# Patient Record
Sex: FEMALE | Race: WHITE
[De-identification: names, ages, dates, MRNs, and addresses within clinical notes are randomized per-mention and may not be internally consistent; named-entity substitution may affect disease eponyms.]

---

## 2018-12-30 ENCOUNTER — HOSPITAL ENCOUNTER (EMERGENCY)
Dept: HOSPITAL 76 - ED | Age: 23
Discharge: HOME | End: 2018-12-30
Payer: COMMERCIAL

## 2018-12-30 VITALS — DIASTOLIC BLOOD PRESSURE: 57 MMHG | SYSTOLIC BLOOD PRESSURE: 96 MMHG

## 2018-12-30 DIAGNOSIS — R10.10: ICD-10-CM

## 2018-12-30 DIAGNOSIS — R11.2: ICD-10-CM

## 2018-12-30 DIAGNOSIS — E86.0: Primary | ICD-10-CM

## 2018-12-30 DIAGNOSIS — R19.7: ICD-10-CM

## 2018-12-30 LAB
ALBUMIN DIAFP-MCNC: 4.6 G/DL (ref 3.2–5.5)
ALBUMIN/GLOB SERPL: 1.7 {RATIO} (ref 1–2.2)
ALP SERPL-CCNC: 38 IU/L (ref 42–121)
ALT SERPL W P-5'-P-CCNC: 33 IU/L (ref 10–60)
ANION GAP SERPL CALCULATED.4IONS-SCNC: 9 MMOL/L (ref 6–13)
AST SERPL W P-5'-P-CCNC: 31 IU/L (ref 10–42)
BASOPHILS NFR BLD AUTO: 0 10^3/UL (ref 0–0.1)
BASOPHILS NFR BLD AUTO: 0.2 %
BILIRUB BLD-MCNC: 0.7 MG/DL (ref 0.2–1)
BUN SERPL-MCNC: 16 MG/DL (ref 6–20)
CALCIUM UR-MCNC: 9 MG/DL (ref 8.5–10.3)
CHLORIDE SERPL-SCNC: 108 MMOL/L (ref 101–111)
CO2 SERPL-SCNC: 25 MMOL/L (ref 21–32)
CREAT SERPLBLD-SCNC: 0.6 MG/DL (ref 0.4–1)
EOSINOPHIL # BLD AUTO: 0 10^3/UL (ref 0–0.7)
EOSINOPHIL NFR BLD AUTO: 0.3 %
ERYTHROCYTE [DISTWIDTH] IN BLOOD BY AUTOMATED COUNT: 12.6 % (ref 12–15)
GFRSERPLBLD MDRD-ARVRAT: 126 ML/MIN/{1.73_M2} (ref 89–?)
GLOBULIN SER-MCNC: 2.7 G/DL (ref 2.1–4.2)
GLUCOSE SERPL-MCNC: 156 MG/DL (ref 70–100)
HGB UR QL STRIP: 14.9 G/DL (ref 12–16)
LIPASE SERPL-CCNC: 19 U/L (ref 22–51)
LYMPHOCYTES # SPEC AUTO: 0.3 10^3/UL (ref 1.5–3.5)
LYMPHOCYTES NFR BLD AUTO: 3 %
MCH RBC QN AUTO: 30.7 PG (ref 27–31)
MCHC RBC AUTO-ENTMCNC: 35.2 G/DL (ref 32–36)
MCV RBC AUTO: 87.1 FL (ref 81–99)
MONOCYTES # BLD AUTO: 0.6 10^3/UL (ref 0–1)
MONOCYTES NFR BLD AUTO: 5.8 %
NEUTROPHILS # BLD AUTO: 8.9 10^3/UL (ref 1.5–6.6)
NEUTROPHILS # SNV AUTO: 9.8 X10^3/UL (ref 4.8–10.8)
NEUTROPHILS NFR BLD AUTO: 90.7 %
PDW BLD AUTO: 8.2 FL (ref 7.9–10.8)
PLATELET # BLD: 194 10^3/UL (ref 130–450)
PROT SPEC-MCNC: 7.3 G/DL (ref 6.7–8.2)
RBC MAR: 4.87 10^6/UL (ref 4.2–5.4)
SODIUM SERPLBLD-SCNC: 142 MMOL/L (ref 135–145)

## 2018-12-30 PROCEDURE — 96361 HYDRATE IV INFUSION ADD-ON: CPT

## 2018-12-30 PROCEDURE — 83690 ASSAY OF LIPASE: CPT

## 2018-12-30 PROCEDURE — 96375 TX/PRO/DX INJ NEW DRUG ADDON: CPT

## 2018-12-30 PROCEDURE — 96374 THER/PROPH/DIAG INJ IV PUSH: CPT

## 2018-12-30 PROCEDURE — 80053 COMPREHEN METABOLIC PANEL: CPT

## 2018-12-30 PROCEDURE — 85025 COMPLETE CBC W/AUTO DIFF WBC: CPT

## 2018-12-30 PROCEDURE — 99283 EMERGENCY DEPT VISIT LOW MDM: CPT

## 2018-12-30 PROCEDURE — 36415 COLL VENOUS BLD VENIPUNCTURE: CPT

## 2018-12-30 NOTE — ED PHYSICIAN DOCUMENTATION
PD HPI NVD





- Stated complaint


Stated Complaint: VOMITING





- Chief complaint


Chief Complaint: Abd Pain





- History obtained from


History obtained from: Patient





- History of Present Illness


Timing - onset: How many hours ago (5), Last night


Timing - duration: Hours (5)


Timing - details: Abrupt onset, Still present


Associated symptoms: Abdominal pain (cramping upper), Loss of appetite.  No: 

Fever, Chest pain, Near syncope / syncope


Contributing factors: No: Sick contact, Bad food, Travel, Recent antibiotics


Similar symptoms before: Has not had sx before (has had C.Diff with profuse 

diarrhea but did not present as this current illness (both vomiting and 

diarrhea))


Recently seen: Not recently seen





Review of Systems


Constitutional: reports: Chills, Myalgias.  denies: Fever


Nose: reports: Congestion (for 1-2 days).  denies: Rhinorrhea / runny nose


Throat: denies: Sore throat


Respiratory: denies: Cough


GI: reports: Abdominal Pain, Nausea, Vomiting, Diarrhea.  denies: Abdominal 

Swelling, Hematemesis, Bloody / black stool


: denies: Dysuria, Frequency


Neurologic: reports: Generalized weakness.  denies: Near syncope, Syncope, 

Altered mental status, Headache





PD PAST MEDICAL HISTORY





- Past Medical History


Past Medical History: No


Cardiovascular: None


Respiratory: None


Neuro: None


Endocrine/Autoimmune: None


GI: C.difficile





- Past Surgical History


Past Surgical History: No





- Present Medications


Home Medications: 


                                Ambulatory Orders











 Medication  Instructions  Recorded  Confirmed


 


Diphenoxylate/Atropine [Lomotil] 1 each PO QID PRN #12 tablet 12/30/18 


 


Ondansetron Odt [Zofran] 4 mg TL Q6H PRN #10 tablet 12/30/18 


 


Saccharomyces Boulardii [Florastor] 250 mg PO BID #20 capsule 12/30/18 














- Allergies


Allergies/Adverse Reactions: 


                                    Allergies











Allergy/AdvReac Type Severity Reaction Status Date / Time


 


No Known Drug Allergies Allergy   Verified 12/30/18 03:55














- Social History


Does the pt smoke?: No


Smoking Status: Never smoker


Does the pt drink ETOH?: Yes


Does the pt have substance abuse?: No





- Immunizations


Immunizations are current?: Yes





PD ED PE NORMAL





- Vitals


Vital signs reviewed: Yes





- General


General: Alert and oriented X 3, Well developed/nourished, Other (appears 

uncomfortable with nausea)





- HEENT


HEENT: Pharynx benign





- Neck


Neck: Supple, no meningeal sign, No adenopathy





- Cardiac


Cardiac: No murmur.  No: RRR (tachycardia but regular)





- Respiratory


Respiratory: Clear bilaterally





- Abdomen


Abdomen: Soft, Non tender, Non distended, No organomegaly.  No: Normal bowel 

sounds (increased/hyperactive)





- Female 


Female : Deferred





- Rectal


Rectal: Deferred





- Back


Back: No CVA TTP





- Derm


Derm: Warm and dry.  No: Normal color (pale)





- Neuro


Neuro: Alert and oriented X 3, No motor deficit, Normal speech





Results





- Vitals


Vitals: 


                               Vital Signs - 24 hr











  12/30/18 12/30/18 12/30/18





  03:51 05:04 06:10


 


Temperature 36.5 C  


 


Heart Rate 104 H 92 101 H


 


Respiratory 18 18 18





Rate   


 


Blood Pressure 108/56 L 96/63 96/57 L


 


O2 Saturation 100 100 100








                                     Oxygen











O2 Source                      Room air

















- Labs


Labs: 


                                Laboratory Tests











  12/30/18 12/30/18





  04:00 04:00


 


WBC  9.8 


 


RBC  4.87 


 


Hgb  14.9 


 


Hct  42.4 


 


MCV  87.1 


 


MCH  30.7 


 


MCHC  35.2 


 


RDW  12.6 


 


Plt Count  194 


 


MPV  8.2 


 


Neut # (Auto)  8.9 H 


 


Lymph # (Auto)  0.3 L 


 


Mono # (Auto)  0.6 


 


Eos # (Auto)  0.0 


 


Baso # (Auto)  0.0 


 


Absolute Nucleated RBC  0.00 


 


Nucleated RBC %  0.0 


 


Sodium   142


 


Potassium   3.4 L


 


Chloride   108


 


Carbon Dioxide   25


 


Anion Gap   9.0


 


BUN   16


 


Creatinine   0.6


 


Estimated GFR (MDRD)   126


 


Glucose   156 H


 


Calcium   9.0


 


Total Bilirubin   0.7


 


AST   31


 


ALT   33


 


Alkaline Phosphatase   38 L


 


Total Protein   7.3


 


Albumin   4.6


 


Globulin   2.7


 


Albumin/Globulin Ratio   1.7


 


Lipase   19 L














PD MEDICAL DECISION MAKING





- ED course


Complexity details: re-evaluated patient (feeling improved), considered 

differential (Seems likely a viral gastroenteritis.  She had not had any unusual

 foods.  She has not had any recent travel.  We will see if this resolves over a

 day or so.  If persistent symptoms then consider other etiologies.  At this 

point I do not have suspicion for acute surgical process such as appendix nor 

ovarian causes.), d/w patient





Departure





- Departure


Disposition: 01 Home, Self Care


Clinical Impression: 


 Nausea vomiting and diarrhea, Dehydration





Condition: Stable


Record reviewed to determine appropriate education?: Yes


Instructions:  ED Gastroenteritis Vs Food Poison


Prescriptions: 


Diphenoxylate/Atropine [Lomotil] 1 each PO QID PRN #12 tablet


 PRN Reason: Diarrhea


Ondansetron Odt [Zofran] 4 mg TL Q6H PRN #10 tablet


 PRN Reason: Nausea / Vomiting


Saccharomyces Boulardii [Florastor] 250 mg PO BID #20 capsule


Comments: 


Your symptoms most likely are from a viral illness and these typically last a 

day or 2 at the most and then improve.  Recheck if not improved well over the 

next couple of days.  For symptoms use ondansetron for nausea and Lomotil for 

diarrhea.  An illness like this can disrupt the natural intestinal balance of 

germs and so consider taking a probiotic such as Florastor in order to reduce 

the possibility of recurrent C. difficile episode.  Rest off work for the next 

day or 2.  Tylenol if needed for pains.


Forms:  Activity restrictions


Discharge Date/Time: 12/30/18 06:20

## 2019-04-15 ENCOUNTER — HOSPITAL ENCOUNTER (EMERGENCY)
Dept: HOSPITAL 76 - ED | Age: 24
Discharge: HOME | End: 2019-04-15
Payer: COMMERCIAL

## 2019-04-15 VITALS — DIASTOLIC BLOOD PRESSURE: 64 MMHG | SYSTOLIC BLOOD PRESSURE: 110 MMHG

## 2019-04-15 DIAGNOSIS — E87.6: ICD-10-CM

## 2019-04-15 DIAGNOSIS — R00.0: Primary | ICD-10-CM

## 2019-04-15 LAB
ALBUMIN DIAFP-MCNC: 5 G/DL (ref 3.2–5.5)
ALBUMIN/GLOB SERPL: 1.8 {RATIO} (ref 1–2.2)
ALP SERPL-CCNC: 42 IU/L (ref 42–121)
ALT SERPL W P-5'-P-CCNC: 22 IU/L (ref 10–60)
ANION GAP SERPL CALCULATED.4IONS-SCNC: 15 MMOL/L (ref 6–13)
AST SERPL W P-5'-P-CCNC: 27 IU/L (ref 10–42)
BASE EXCESS BLDV CALC-SCNC: -2.9 MMOL/L
BASOPHILS NFR BLD AUTO: 0.1 10^3/UL (ref 0–0.1)
BASOPHILS NFR BLD AUTO: 0.4 %
BILIRUB BLD-MCNC: 1 MG/DL (ref 0.2–1)
BUN SERPL-MCNC: 14 MG/DL (ref 6–20)
CALCIUM UR-MCNC: 9.8 MG/DL (ref 8.5–10.3)
CHLORIDE SERPL-SCNC: 102 MMOL/L (ref 101–111)
CLARITY UR REFRACT.AUTO: CLEAR
CO2 BLDV-SCNC: 20.9 MMOL/L (ref 24–29)
CO2 SERPL-SCNC: 20 MMOL/L (ref 21–32)
CREAT SERPLBLD-SCNC: 0.6 MG/DL (ref 0.4–1)
EOSINOPHIL # BLD AUTO: 0.2 10^3/UL (ref 0–0.7)
EOSINOPHIL NFR BLD AUTO: 1.2 %
ERYTHROCYTE [DISTWIDTH] IN BLOOD BY AUTOMATED COUNT: 12.8 % (ref 12–15)
GFRSERPLBLD MDRD-ARVRAT: 125 ML/MIN/{1.73_M2} (ref 89–?)
GLOBULIN SER-MCNC: 2.8 G/DL (ref 2.1–4.2)
GLUCOSE SERPL-MCNC: 107 MG/DL (ref 70–100)
GLUCOSE UR QL STRIP.AUTO: NEGATIVE MG/DL
HCG UR QL: NEGATIVE
HGB UR QL STRIP: 15.7 G/DL (ref 12–16)
KETONES UR QL STRIP.AUTO: NEGATIVE MG/DL
LIPASE SERPL-CCNC: 21 U/L (ref 22–51)
LYMPHOCYTES # SPEC AUTO: 3.2 10^3/UL (ref 1.5–3.5)
LYMPHOCYTES NFR BLD AUTO: 24.7 %
MCH RBC QN AUTO: 29.1 PG (ref 27–31)
MCHC RBC AUTO-ENTMCNC: 33.9 G/DL (ref 32–36)
MCV RBC AUTO: 85.6 FL (ref 81–99)
MONOCYTES # BLD AUTO: 1 10^3/UL (ref 0–1)
MONOCYTES NFR BLD AUTO: 7.2 %
NEUTROPHILS # BLD AUTO: 8.7 10^3/UL (ref 1.5–6.6)
NEUTROPHILS # SNV AUTO: 13.1 X10^3/UL (ref 4.8–10.8)
NEUTROPHILS NFR BLD AUTO: 66.5 %
NITRITE UR QL STRIP.AUTO: NEGATIVE
PCO2 BLDV: 30.7 MMHG (ref 41–51)
PDW BLD AUTO: 8.1 FL (ref 7.9–10.8)
PH BLDV: 7.43 [PH] (ref 7.31–7.41)
PH UR STRIP.AUTO: 5 PH (ref 5–7.5)
PLATELET # BLD: 284 10^3/UL (ref 130–450)
PO2 BLDV: 48.4 MMHG (ref 25–47)
PROT SPEC-MCNC: 7.8 G/DL (ref 6.7–8.2)
PROT UR STRIP.AUTO-MCNC: NEGATIVE MG/DL
RBC # UR STRIP.AUTO: (no result) /UL
RBC # URNS HPF: (no result) /HPF (ref 0–5)
RBC MAR: 5.39 10^6/UL (ref 4.2–5.4)
SODIUM SERPLBLD-SCNC: 137 MMOL/L (ref 135–145)
SP GR UR STRIP.AUTO: 1.01 (ref 1–1.03)
SQUAMOUS URNS QL MICRO: (no result)
UROBILINOGEN UR QL STRIP.AUTO: (no result) E.U./DL
UROBILINOGEN UR STRIP.AUTO-MCNC: NEGATIVE MG/DL

## 2019-04-15 PROCEDURE — 82803 BLOOD GASES ANY COMBINATION: CPT

## 2019-04-15 PROCEDURE — 87086 URINE CULTURE/COLONY COUNT: CPT

## 2019-04-15 PROCEDURE — 83690 ASSAY OF LIPASE: CPT

## 2019-04-15 PROCEDURE — 80053 COMPREHEN METABOLIC PANEL: CPT

## 2019-04-15 PROCEDURE — 85025 COMPLETE CBC W/AUTO DIFF WBC: CPT

## 2019-04-15 PROCEDURE — 36415 COLL VENOUS BLD VENIPUNCTURE: CPT

## 2019-04-15 PROCEDURE — 99283 EMERGENCY DEPT VISIT LOW MDM: CPT

## 2019-04-15 PROCEDURE — 81001 URINALYSIS AUTO W/SCOPE: CPT

## 2019-04-15 PROCEDURE — 81003 URINALYSIS AUTO W/O SCOPE: CPT

## 2019-04-15 PROCEDURE — 93005 ELECTROCARDIOGRAM TRACING: CPT

## 2019-04-15 PROCEDURE — 81025 URINE PREGNANCY TEST: CPT

## 2019-04-15 NOTE — ED PHYSICIAN DOCUMENTATION
PD HPI CHEST PAIN





- Stated complaint


Stated Complaint: HIGH BP/WEAKNESS/SHAKING





- Chief complaint


Chief Complaint: Cardiac





- History obtained from


History obtained from: Patient





- History of Present Illness


Timing - onset: Today (She was driving on her way to work, started to feel pre-

syncopal and her apple watch alarmed for tachycardia. Lasted only seconds. 

Dyspnea while it was happening. Never had this before.)





- Additional information


Additional information: 





She is not on birth control, no recent travel, no calf pain or pedal edema.





Review of Systems


Ten Systems: 10 systems reviewed and negative


Constitutional: denies: Fever, Chills


Cardiac: reports: Chest pain / pressure, Palpitations.  denies: Pedal edema, 

Calf pain


Respiratory: reports: Dyspnea.  denies: Cough


GI: denies: Abdominal Pain





PD PAST MEDICAL HISTORY





- Past Medical History


Cardiovascular: None


Respiratory: None


Neuro: None


Endocrine/Autoimmune: None


GI: C.difficile





- Past Surgical History


Past Surgical History: No





- Allergies


Allergies/Adverse Reactions: 


                                    Allergies











Allergy/AdvReac Type Severity Reaction Status Date / Time


 


No Known Drug Allergies Allergy   Verified 04/15/19 15:25














- Social History


Does the pt smoke?: No


Smoking Status: Never smoker


Does the pt drink ETOH?: Yes


Does the pt have substance abuse?: No





- Immunizations


Immunizations are current?: Yes





PD ED PE NORMAL





- Vitals


Vital signs reviewed: Yes





- General


General: Alert and oriented X 3, No acute distress





- HEENT


HEENT: PERRL, EOMI





- Neck


Neck: Supple, no meningeal sign, No bony TTP





- Cardiac


Cardiac: RRR, No murmur





- Respiratory


Respiratory: No respiratory distress, Clear bilaterally





- Abdomen


Abdomen: Non tender





- Extremities


Extremities: No edema, No calf tenderness / cord





- Neuro


Neuro: Alert and oriented X 3, Normal speech





Results





- Vitals


Vitals: 


                               Vital Signs - 24 hr











  04/15/19 04/15/19





  15:23 17:46


 


Temperature 37.2 C 


 


Heart Rate 109 H 64


 


Respiratory 22 14





Rate  


 


Blood Pressure 150/75 H 110/64


 


O2 Saturation 100 98








                                     Oxygen











O2 Source                      Room air

















- EKG (time done)


  ** 1542


Rate: Rate (enter#) (118)


Rhythm: Sinus tachycardia


Axis: Normal


Intervals: Normal AR


QRS: Normal


Ischemia: Non specific changes


Computer interpretation: Agree with computer





- Labs


Labs: 


                                Laboratory Tests











  04/15/19 04/15/19 04/15/19





  15:39 15:39 15:39


 


WBC  13.1 H  


 


RBC  5.39  


 


Hgb  15.7  


 


Hct  46.2  


 


MCV  85.6  


 


MCH  29.1  


 


MCHC  33.9  


 


RDW  12.8  


 


Plt Count  284  


 


MPV  8.1  


 


Neut # (Auto)  8.7 H  


 


Lymph # (Auto)  3.2  


 


Mono # (Auto)  1.0  


 


Eos # (Auto)  0.2  


 


Baso # (Auto)  0.1  


 


Absolute Nucleated RBC  0.02  


 


Nucleated RBC %  0.2  


 


VBG pH    7.430 H


 


VBG pCO2    30.7 L


 


VBG pO2    48.4 H


 


VBG HCO3    20.0 L


 


VBG Total CO2    20.9 L


 


VBG O2 Saturation    88.1 H


 


VBG Base Excess    -2.9 L


 


Sodium   137 


 


Potassium   2.6 L 


 


Chloride   102 


 


Carbon Dioxide   20 L 


 


Anion Gap   15.0 H 


 


BUN   14 


 


Creatinine   0.6 


 


Estimated GFR (MDRD)   125 


 


Glucose   107 H 


 


Calcium   9.8 


 


Total Bilirubin   1.0 


 


AST   27 


 


ALT   22 


 


Alkaline Phosphatase   42 


 


Total Protein   7.8 


 


Albumin   5.0 


 


Globulin   2.8 


 


Albumin/Globulin Ratio   1.8 


 


Lipase   21 L 


 


Urine Color   


 


Urine Clarity   


 


Urine pH   


 


Ur Specific Gravity   


 


Urine Protein   


 


Urine Glucose (UA)   


 


Urine Ketones   


 


Urine Occult Blood   


 


Urine Nitrite   


 


Urine Bilirubin   


 


Urine Urobilinogen   


 


Ur Leukocyte Esterase   


 


Urine RBC   


 


Urine WBC   


 


Ur Squamous Epith Cells   


 


Urine Bacteria   


 


Ur Microscopic Review   


 


Urine Culture Comments   


 


Urine HCG, Qual   














  04/15/19





  15:46


 


WBC 


 


RBC 


 


Hgb 


 


Hct 


 


MCV 


 


MCH 


 


MCHC 


 


RDW 


 


Plt Count 


 


MPV 


 


Neut # (Auto) 


 


Lymph # (Auto) 


 


Mono # (Auto) 


 


Eos # (Auto) 


 


Baso # (Auto) 


 


Absolute Nucleated RBC 


 


Nucleated RBC % 


 


VBG pH 


 


VBG pCO2 


 


VBG pO2 


 


VBG HCO3 


 


VBG Total CO2 


 


VBG O2 Saturation 


 


VBG Base Excess 


 


Sodium 


 


Potassium 


 


Chloride 


 


Carbon Dioxide 


 


Anion Gap 


 


BUN 


 


Creatinine 


 


Estimated GFR (MDRD) 


 


Glucose 


 


Calcium 


 


Total Bilirubin 


 


AST 


 


ALT 


 


Alkaline Phosphatase 


 


Total Protein 


 


Albumin 


 


Globulin 


 


Albumin/Globulin Ratio 


 


Lipase 


 


Urine Color  YELLOW


 


Urine Clarity  CLEAR


 


Urine pH  5.0


 


Ur Specific Gravity  1.010


 


Urine Protein  NEGATIVE


 


Urine Glucose (UA)  NEGATIVE


 


Urine Ketones  NEGATIVE


 


Urine Occult Blood  TRACE-LYSE


 


Urine Nitrite  NEGATIVE


 


Urine Bilirubin  NEGATIVE


 


Urine Urobilinogen  0.2 (NORMAL)


 


Ur Leukocyte Esterase  TRACE H


 


Urine RBC  0-5


 


Urine WBC  4-5


 


Ur Squamous Epith Cells  FEW Squamous


 


Urine Bacteria  Many H


 


Ur Microscopic Review  INDICATED


 


Urine Culture Comments  INDICATED


 


Urine HCG, Qual  NEGATIVE














PD MEDICAL DECISION MAKING





- ED course


ED course: 





This is a 24-year-old woman who had a brief episode of palpitations just prior 

to arrival with high heart rate up to 175 on her apple watch.  At this point she

feels back to normal.  She has number of tests running with the [employer] including 

thyroid studies.  This should be resulted soon.  Her workup.  Demonstrates only 

modest hypokalemia.  Nothing in the history to suggest PE.





Departure





- Departure


Disposition: 01 Home, Self Care


Clinical Impression: 


 Tachycardia





Condition: Good


Record reviewed to determine appropriate education?: Yes


Instructions:  ED Tachycardia Pat PSVT


Comments: 


As discussed it sounds like he had a resolved episode of supraventricular 

tachycardia.  If this becomes a recurrent issue talk with your doctor about a 

Holter monitor.  Return anytime if worse.


Discharge Date/Time: 04/15/19 17:40